# Patient Record
Sex: FEMALE | Race: WHITE
[De-identification: names, ages, dates, MRNs, and addresses within clinical notes are randomized per-mention and may not be internally consistent; named-entity substitution may affect disease eponyms.]

---

## 2019-12-28 ENCOUNTER — HOSPITAL ENCOUNTER (EMERGENCY)
Dept: HOSPITAL 25 - UCCORT | Age: 24
Discharge: HOME | End: 2019-12-28
Payer: COMMERCIAL

## 2019-12-28 VITALS — DIASTOLIC BLOOD PRESSURE: 86 MMHG | SYSTOLIC BLOOD PRESSURE: 137 MMHG

## 2019-12-28 DIAGNOSIS — L73.9: Primary | ICD-10-CM

## 2019-12-28 DIAGNOSIS — L30.4: ICD-10-CM

## 2019-12-28 DIAGNOSIS — R53.83: ICD-10-CM

## 2019-12-28 DIAGNOSIS — Z88.1: ICD-10-CM

## 2019-12-28 PROCEDURE — 99202 OFFICE O/P NEW SF 15 MIN: CPT

## 2019-12-28 PROCEDURE — G0463 HOSPITAL OUTPT CLINIC VISIT: HCPCS

## 2019-12-28 NOTE — XMS REPORT
Continuity of Care Document (C-CDA) (Encounter date: 2019 12:00 AM)

 Created on:2019



Patient:Laney

Sex:Female

:1995

External Reference #:[id number]





Demographics







 Address  [address] [place] [address]

 

 Mobile Phone  [phone]

 

 Home Phone  [phone]

 

 Email Address  [email or web address]

 

 Preferred Language  English

 

 Marital Status  Not  or 

 

 Congregation Affiliation  Unknown

 

 Race  White

 

 Ethnic Group  Not  or 









Author







 Organization  [address] [place] [address]

 

 Address  [address] [place] [address]

 

 Phone  [phone]









Support







 Name  Relationship  Address  Phone

 

 Sander Davison  parent  [address]  [phone]



     [place] [address]  

 

 sander davison  Unavailable  Unavailable  [phone]

 

 COURTNEY OQUENDO  Unavailable  Unavailable  Unavailable









Allergies, Adverse Reactions, Alerts







 Substance  Reaction  Status

 

 doxycycline  upper abdominal pain  Active







Medications







 Medication  Instructions  Dosage  Effective Dates (start - stop)  Status  
Comments









 Drug Treatment Unknown







Problems







 Condition  Effective Dates (start - stop)  Clinical Status









 Unknown







Procedures







 Procedure  Date









 Procedure Unknown







Results







 Test Name  Date and Time  Measure  Units  Reference Range  Abnormal Flag  
Status  Comments









 Unknown







Encounters







 Encounter  Practice  Location  Reason(s)  Diagnoses  Date  Provider  Providers



 Description      For Visit        Copied on



               Encounter

 

   [address] [place] [address]  Ambulatory      Dec-18    



   Inc,   Surgery      -    



   [place], [place],            



   [facility],             



   tel:[phone]            

 

   [address] [place] [address] Primary        DHRUVBABITA  



   [facility],   Care Deposit      -  COURTNEY.  



   [facility]  



   [place], [place],          [place],  



   [address], [place]          [place], [address].  



   tel:[phone]           tel:[phone]          [phone]  

 

   [address] [place] [address] Primary      Sep-19  [facility].  



   Inc,   Care Deposit        .  



   [place], [place],            



   [facility],             



   tel:[phone]            

 

   [address] [place] [address] Primary      May-19  [facility] ALBERTO.  Referring



   Inc,   Care Deposit      -  .  Provider:



   Fortunato DOMINGUEZ [facility].



   [place], [place],            



   [facility],             



   tel:[phone]            

 

   [address] [place] [address] Primary      Mar-17    



   Inc,   Care Deposit      -    



   [place], [place],            



   [address], [place]            



   tel:[phone]            

 

   1 - [facility] Primary      Mar-11    



   Inc, [address]  Care Deposit      -    



   [place], [place],            



   [address], [place]            



   tel:[phone]            

 

   1 - [facility] Primary      Feb-  LEAH  



   Inc, [address]  Care Deposit      -  AUSTINREBA Bucio          [facility] [address] [place], [place], [place],          , [place],  



   [address], [place]          [address], [place].  



   tel:[phone]           tel:[phone]          [phone]  







Family History







 Family Member  Diagnosis  Age At Onset

 

 Maternal grandfather  MI/Bypass  

 

 Father  kidney stones, diverticulosis  

 

 Paternal grandfather  MI/Lung Cancer (Cause Of Death)  

 

 Paternal grandfather    

 

 Mother  Healthy  

 

 Paternal grandfather    







Immunizations







 Vaccine  Date  Status  Comments

 

 meningococcal - Menactra  Sep-  administered  Source: New Immunization



       Record



       



       



       



       



       



       

 

 HPV  Sep-  administered  Source: New Immunization



       Record



       



       



       



       



       



       

 

 HPV  May-  administered  Note: Advised to return 4



       months from today for 3rd HPV



       vac.



       



       



       



       



       



       



       



       



       



       



       



       



       



       



       



       



       



       



       



       



       



       



       



       



       



       



       



       



       



       



                            ;



       Source: New Immunization



       Record

 

 HPV  Mar-  administered  Note: NDC#[id number]



       



       



       



       



       



       



       



       



       



       



       



       



       



       



       



       



       



       



       



       



       



       



       



       



       



       



       



       



       



       



       



       



                        ; Source:



       New Immunization Record

 

 Tdap  Mar-  administered  Note: NDC#[id number]



       



       



       



       



       



       



       



       



       



       



       



       



       



       



       



       



       



       



       



       



       



       



       



       



       



       



       



       



       



       



       



       



                        ; Source:



       New Immunization Record

 

 OPV  Mar-  administered  Note: Abstracted -2008



       



       



       



       



       



       



       



       



       



       



       



       



       



       



       



       



       



       



       



       



       



       



       



       



       



       



       



       



       



       



       



       



                        ; Source:



       New Immunization Record

 

 MMR  Mar-  administered  Note: Abstracted -2008



       



       



       



       



       



       



       



       



       



       



       



       



       



       



       



       



       



       



       



       



       



       



       



       



       



       



       



       



       



       



       



       



                        ; Source:



       New Immunization Record

 

 DTaP  Mar-  administered  Note: Abstracted -2008



       



       



       



       



       



       



       



       



       



       



       



       



       



       



       



       



       



       



       



       



       



       



       



       



       



       



       



       



       



       



       



       



                        ; Source:



       New Immunization Record

 

 MMR    administered  Note: Abstracted -2008



       



       



       



       



       



       



       



       



       



       



       



       



       



       



       



       



       



       



       



       



       



       



       



       



       



       



       



       



       



       



       



       



                        ; Source:



       New Immunization Record

 

 DTP-Hib    administered  Note: Abstracted -2005



       



       



       



       



       



       



       



       



       



       



       



       



       



       



       



       



       



       



       



       



       



       



       



       



       



       



       



       



       



       



       



       



                        ; Source:



       New Immunization Record

 

 OPV    administered  Note: Abstracted -2005



       



       



       



       



       



       



       



       



       



       



       



       



       



       



       



       



       



       



       



       



       



       



       



       



       



       



       



       



       



       



       



       



                        ; Source:



       New Immunization Record

 

 hep B (ped/adol, 3 dose)  Mar-  administered  Note: Abstracted -2005



       



       



       



       



       



       



       



       



       



       



       



       



       



       



       



       



       



       



       



       



       



       



       



       



       



       



       



       



       



       



       



       



                        ; Source:



       New Immunization Record

 

 DTP  Mar-  administered  Note: Abstracted -2005



       



       



       



       



       



       



       



       



       



       



       



       



       



       



       



       



       



       



       



       



       



       



       



       



       



       



       



       



       



       



       



       



                        ; Source:



       New Immunization Record

 

 hep B (ped/adol, 3 dose)    administered  Note: Abstracted -2005



       



       



       



       



       



       



       



       



       



       



       



       



       



       



       



       



       



       



       



       



       



       



       



       



       



       



       



       



       



       



       



       



                        ; Source:



       New Immunization Record

 

 DTP    administered  Note: Abstracted -2005



       



       



       



       



       



       



       



       



       



       



       



       



       



       



       



       



       



       



       



       



       



       



       



       



       



       



       



       



       



       



       



       



                        ; Source:



       New Immunization Record

 

 OPV    administered  Note: Abstracted 



       



       



       



       



       



       



       



       



       



       



       



       



       



       



       



       



       



       



       



       



       



       



       



       



       



       



       



       



       



       



       



       



                        ; Source:



       New Immunization Record

 

 hep B (ped/adol, 3 dose)    administered  Note: Abstracted 2005



       



       



       



       



       



       



       



       



       



       



       



       



       



       



       



       



       



       



       



       



       



       



       



       



       



       



       



       



       



       



       



       



                        ; Source:



       New Immunization Record

 

 DTP    administered  Note: Abstracted -2005



       



       



       



       



       



       



       



       



       



       



       



       



       



       



       



       



       



       



       



       



       



       



       



       



       



       



       



       



       



       



       



       



                        ; Source:



       New Immunization Record

 

 OPV    administered  Note: Abstracted 



       



       



       



       



       



       



       



       



       



       



       



       



       



       



       



       



       



       



       



       



       



       



       



       



       



       



       



       



       



       



       



       



                        ; Source:



       New Immunization Record







Payers







 Payer name  Insurance type  Covered party ID  Authorization(s)









 Unknown







Social History







 Type  Description  Quantity  Date Captured  Comments









 Unknown







Vital Signs







 Date /  Height  Weight  BMI  Pulse  Blood  Temperature  Respiratory  Body  
Head  BMI



 Time:        Rate  Pressure    Rate  Surface  Circumference  percentile



                 Area    









 Unknown







Chief Complaint And Reason For Visit

No information



Reason For Referral







 Reason For Referral

 

 Unknown







Plan Of Care







 Date  Type  Action  Status

 

   Appointment  LUCHO DAVISON  

 

   Appointment  LUCHO DAVISON  









 Date  Type  Problem  Goal  Intervention  Status  Start Date









 Unknown







History Of Present Illness







 Encounter Date  Complaint  History Of Present Illness









 No information







Functional Status







 Encounter Date  Functional Assessment  Cognitive Assessment









 Unknown







Medications Administered







 Medication  Instructions  Dosage  Effective Dates (start - stop)  Status  
Comments









 Drug Treatment Unknown







Instructions







 Date  Instruction  Additional Information









 Unknown

## 2019-12-28 NOTE — UC
Skin Complaint HPI





- HPI Summary


HPI Summary: 





24 year old female recently underwent decompression of R shoulder 12/18 

presents with rash, itchy, under R arm which is in sling since 12/18, beneath 

her breasts, and on abdomen.   Started under her right arm where her skin was 

in constant contact due to the sling, then spread.   Patient is obese.   ALso c/

o raised rash on R outer arm without itch, pain.   no spreading of this rash 

noted.    





no prior occurrences, no fever, chills.   + sweating 











- History of Current Complaint


Chief Complaint: UCRash


Time Seen by Provider: 12/28/19 18:45


Stated Complaint: RASH


Hx Obtained From: Patient


Hx Last Menstrual Period: 12/27/19


Pregnant?: No


Onset/Duration: Sudden Onset, Lasting Days


Skin Exposure Onset/Duration: Days Ago


Onset Severity: Moderate


Current Severity: Moderate


Pain Intensity: 7


Pain Scale Used: 0-10 Numeric


Location: Diffuse - abdomen, right arm





- Allergy/Home Medications


Allergies/Adverse Reactions: 


 Allergies











Allergy/AdvReac Type Severity Reaction Status Date / Time


 


doxycycline Allergy Severe Abdominal Verified 12/28/19 18:46





   Pain  











Home Medications: 


 Home Medications





Iud   12/28/19 [History]


diphenhydrAMINE HCl [Benadryl Allergy] 25 mg PO ONCE PRN 12/28/19 [History 

Confirmed 12/28/19]


diphenhydrAMINE HCl [Benadryl Allergy] 50 mg PO Q17H PRN 12/28/19 [History 

Confirmed 12/28/19]











PMH/Surg Hx/FS Hx/Imm Hx


Previously Healthy: Yes





- Surgical History


Surgical History: Yes


Surgery Procedure, Year, and Place: left shoulder 12/18/19; right shoulder 1/ 2013, 1/2014; Links procedure with titanium





- Family History


Known Family History: Positive: Non-Contributory





- Social History


Occupation: Employed Full-time


Alcohol Use: Rare


Substance Use Type: None


Smoking Status (MU): Never Smoked Tobacco





Review of Systems


All Other Systems Reviewed And Are Negative: Yes


Constitutional: Negative: Fever, Chills, Fatigue


Skin: Positive: Rash


Genitourinary: Positive: Negative


Motor: Positive: Negative


Is Patient Immunocompromised?: No





Physical Exam


Triage Information Reviewed: Yes


Appearance: Well-Appearing, No Pain Distress, Well-Nourished


Vital Signs: 


 Initial Vital Signs











Temp  98.2 F   12/28/19 18:35


 


Pulse  105   12/28/19 18:35


 


Resp  22   12/28/19 18:35


 


BP  137/86   12/28/19 18:35


 


Pulse Ox  100   12/28/19 18:35











Vital Signs Reviewed: Yes


Eyes: Positive: Conjunctiva Clear


ENT: Positive: Hearing grossly normal


Respiratory: Positive: Chest non-tender


Musculoskeletal Exam: Normal


Neurological Exam: Normal


Psychological Exam: Normal


Skin: Positive: Other - between medial upper arm and body skin with shiny, 

erythematous appearance, no scaling, sharply demarcated.   In breast folds and 

on sides of bra straps similar appearance.   On right outer arm, proximally, 

raised follicular pattern mild erythematous area without excoriations, drainage 

noted.    consistent with rubbing of strap of sling.





Course/Dx





- Course


Course Of Treatment: 





Folliculitis:   Keep area dry, clean.   Antibiotic ointment twice daily.   

Avoid rubbing in area 








Intertrigo: 


- Hydroxyzin 25 mg as needed at bedtime to help with itching. 


- Daily cream to area twice daily 


- Oral medication given to help at urgent care- Diflucan 


- Benadryl as needed for itching. 


-Daily cleansing of intertriginous skin with a mild cleanser followed by drying 

of affected area with a hair dryer on a cool setting


-Aeration of affected area when feasible


-Daily application of drying powders


-Use of absorbent material or clothing, such as cotton or quintana wool, to 

separate skin in folds





- Diagnoses


Provider Diagnosis: 


 Folliculitis, Intertrigo








Discharge ED





- Sign-Out/Discharge


Documenting (check all that apply): Patient Departure


All imaging exams completed and their final reports reviewed: No Studies





- Discharge Plan


Condition: Good


Disposition: HOME


Prescriptions: 


Clotrimazole 1% CREAM* [Clotrimazole 1%*] 1 applic TOPICAL BID #1 tube


Fluconazole 150 MG TAB* [Diflucan 150 MG TAB*] 150 mg PO ONCE #1 tablet


hydrOXYzine HCL TAB* [Atarax 25 MG TAB*] 25 mg PO TID PRN #15 tab


 PRN Reason: Itching


Patient Education Materials:  Folliculitis (ED), Skin Yeast Infection (ED)


Referrals: 


No Primary Care Phys,NOPCP [Primary Care Provider] - 


[facility] [Outside]


Additional Instructions: 


Folliculitis:   Keep area dry, clean.   Antibiotic ointment twice daily.   

Avoid rubbing in area 








Intertrigo: 


- Hydroxyzin 25 mg as needed at bedtime to help with itching. 


- Daily cream to area twice daily 


- Oral medication given to help at urgent care- Diflucan 


- Benadryl as needed for itching. 


-Daily cleansing of intertriginous skin with a mild cleanser followed by drying 

of affected area with a hair dryer on a cool setting


-Aeration of affected area when feasible


-Daily application of drying powders


-Use of absorbent material or clothing, such as cotton or quintana wool, to 

separate skin in folds





- Billing Disposition and Condition


Condition: GOOD


Disposition: Home





- Attestation Statements


Provider Attestation: 


This patient was not seen by me


I was available for consult


Chart reviewed





LAURA

## 2019-12-28 NOTE — XMS REPORT
Continuity of Care Document (C-CDA) (Encounter date: 2019 07:43 AM)

 Created on:2019



Patient:ANTOLIN

Sex:Female

:1995

External Reference #:[id number]





Demographics







 Address  [address] [place] [address]

 

 Mobile Phone  [phone]

 

 Home Phone  [phone]

 

 Email Address  [email or web address]

 

 Preferred Language  English

 

 Marital Status  Not  or 

 

 Mormon Affiliation  Unknown

 

 Race  White

 

 Ethnic Group  Not  or 









Author







 Organization  [facility]

 

 Address  [address] [place] [address]

 

 Phone  [phone]









Support







 Name  Relationship  Address  Phone

 

 Sander Davison  parent  [address]  [phone]



     [place] [address]  

 

 sander davison  Unavailable  Unavailable  [phone]

 

 COURTNEY OQUENDO  Unavailable  Unavailable  Unavailable









Care Team Providers







 Name  Role  Phone

 

 ARIELLE LYNCH MD  Unavailable  Unavailable









Allergies, Adverse Reactions, Alerts







 Substance  Reaction  Status

 

 doxycycline  upper abdominal pain  Active







Medications







 Medication  Instructions  Dosage  Effective Dates  Status  Comments



       (start - stop)    

 

 Percocet 5 mg-325  take 1 - 2 tablet    Dec- -  No Longer  Reference #:



 mg tablet  by oral route    Dec-  Active  [id number] istop



   every 6 hours as        mdd 8 please



   needed mdd 8        deliver to amb



           surg







Problems







 Condition  Effective Dates (start - stop)  Clinical Status









 Unknown







Procedures







 Procedure  Date









 Procedure Unknown







Results







 Test Name  Date and Time  Measure  Units  Reference Range  Abnormal Flag  
Status  Comments









 Unknown







Encounters







 Encounter  Practice  Location  Reason(s)  Diagnoses  Date  Provider  Providers



 Description      For Visit        Copied on



               Encounter

 

   [address] [place] [address]  Ambulatory      Dec-18  [employer]  



   [facility],   Surgery -      -2019  ARIELLE. [address],          [address].  



   [address], [place]          tel:[phone]  



   tel:[phone]          [phone]  



   [phone]            

 

   [address] [place] [address] Primary        [facility]  



   [facility],   Care Deposit      -  COURTNEY.  



   Fortunato          [facility] [address]  



   [place], [place],          [place],  



   [address], [place]          [place], [address].  



   tel:[phone]          tel:[phone]  



   [phone]          [phone]  

 

   [address] [place] [address] Primary      Sep-19  [facility]. .  



   Inc,   Care Deposit          



   [place], [place],            



   [address],             



   tel:[phone]            

 

   [facility]      May-19  [facility]. .  Referring



   Inc,   Care     Provider:



   [address] [place], [place],            



   [address],             



   tel:[phone]            

 

   [facility] - [facility]      Mar-    



   Inc,   Care Deposit          



   [place],            



   [address], [place]            



   tel:[phone]            

 

   1 - [facility]      Mar-    



   Inc,   Care Deposit          



   [place],            



   [address],             



   tel:[phone]            

 

   1 - [facility]        LEAH  



   Inc,   Care Deposit        PANDA Bucio          [facility] [address] [place], [place], [place], [place], [address],  



   [address],           [place].  



   tel:[phone]          tel:[phone]  



   [phone]          [phone]  







Family History







 Family Member  Diagnosis  Age At Onset

 

 Maternal grandfather  MI/Bypass  

 

 Father  kidney stones, diverticulosis  

 

 Paternal grandfather  MI/Lung Cancer (Cause Of Death)  

 

 Paternal grandfather    

 

 Mother  Healthy  

 

 Paternal grandfather    







Immunizations







 Vaccine  Date  Status  Comments

 

 meningococcal - Menactra  Sep-  administered  Source: New Immunization



       Record



       



       



       



       



       



       

 

 HPV  Sep-  administered  Source: New Immunization



       Record



       



       



       



       



       



       

 

 HPV  May-  administered  Note: Advised to return 4



       months from today for 3rd HPV



       vac.



       



       



       



       



       



       



       



       



       



       



       



       



       



       



       



       



       



       



       



       



       



       



       



       



       



       



       



       



       



       



                            ;



       Source: New Immunization



       Record

 

 HPV  Mar-  administered  Note: NDC#[id number]



       



       



       



       



       



       



       



       



       



       



       



       



       



       



       



       



       



       



       



       



       



       



       



       



       



       



       



       



       



       



       



       



                        ; Source:



       New Immunization Record

 

 Tdap  Mar-  administered  Note: NDC#[id number]



       



       



       



       



       



       



       



       



       



       



       



       



       



       



       



       



       



       



       



       



       



       



       



       



       



       



       



       



       



       



       



       



                        ; Source:



       New Immunization Record

 

 OPV  Mar-  administered  Note: Abstracted -2008



       



       



       



       



       



       



       



       



       



       



       



       



       



       



       



       



       



       



       



       



       



       



       



       



       



       



       



       



       



       



       



       



                        ; Source:



       New Immunization Record

 

 MMR  Mar-  administered  Note: Abstracted -2008



       



       



       



       



       



       



       



       



       



       



       



       



       



       



       



       



       



       



       



       



       



       



       



       



       



       



       



       



       



       



       



       



                        ; Source:



       New Immunization Record

 

 DTaP  Mar-  administered  Note: Abstracted -2008



       



       



       



       



       



       



       



       



       



       



       



       



       



       



       



       



       



       



       



       



       



       



       



       



       



       



       



       



       



       



       



       



                        ; Source:



       New Immunization Record

 

 MMR    administered  Note: Abstracted -2008



       



       



       



       



       



       



       



       



       



       



       



       



       



       



       



       



       



       



       



       



       



       



       



       



       



       



       



       



       



       



       



       



                        ; Source:



       New Immunization Record

 

 DTP-Hib    administered  Note: Abstracted -2005



       



       



       



       



       



       



       



       



       



       



       



       



       



       



       



       



       



       



       



       



       



       



       



       



       



       



       



       



       



       



       



       



                        ; Source:



       New Immunization Record

 

 OPV    administered  Note: Abstracted -2005



       



       



       



       



       



       



       



       



       



       



       



       



       



       



       



       



       



       



       



       



       



       



       



       



       



       



       



       



       



       



       



       



                        ; Source:



       New Immunization Record

 

 hep B (ped/adol, 3 dose)  Mar-  administered  Note: Abstracted -2005



       



       



       



       



       



       



       



       



       



       



       



       



       



       



       



       



       



       



       



       



       



       



       



       



       



       



       



       



       



       



       



       



                        ; Source:



       New Immunization Record

 

 DTP  Mar-  administered  Note: Abstracted -2005



       



       



       



       



       



       



       



       



       



       



       



       



       



       



       



       



       



       



       



       



       



       



       



       



       



       



       



       



       



       



       



       



                        ; Source:



       New Immunization Record

 

 hep B (ped/adol, 3 dose)    administered  Note: Abstracted -2005



       



       



       



       



       



       



       



       



       



       



       



       



       



       



       



       



       



       



       



       



       



       



       



       



       



       



       



       



       



       



       



       



                        ; Source:



       New Immunization Record

 

 DTP    administered  Note: Abstracted 



       



       



       



       



       



       



       



       



       



       



       



       



       



       



       



       



       



       



       



       



       



       



       



       



       



       



       



       



       



       



       



       



                        ; Source:



       New Immunization Record

 

 OPV    administered  Note: Abstracted 



       



       



       



       



       



       



       



       



       



       



       



       



       



       



       



       



       



       



       



       



       



       



       



       



       



       



       



       



       



       



       



       



                        ; Source:



       New Immunization Record

 

 hep B (ped/adol, 3 dose)    administered  Note: Abstracted 2005



       



       



       



       



       



       



       



       



       



       



       



       



       



       



       



       



       



       



       



       



       



       



       



       



       



       



       



       



       



       



       



       



                        ; Source:



       New Immunization Record

 

 DTP    administered  Note: Abstracted 



       



       



       



       



       



       



       



       



       



       



       



       



       



       



       



       



       



       



       



       



       



       



       



       



       



       



       



       



       



       



       



       



                        ; Source:



       New Immunization Record

 

 OPV    administered  Note: Abstracted -2005



       



       



       



       



       



       



       



       



       



       



       



       



       



       



       



       



       



       



       



       



       



       



       



       



       



       



       



       



       



       



       



       



                        ; Source:



       New Immunization Record







Payers







 Payer name  Insurance type  Covered party ID  Authorization(s)

 

 Alda Roland  [id number]  [id number]







Social History







 Type  Description  Quantity  Date Captured  Comments

 

 Alcohol Use Details  Unknown    Dec-  

 

 Caffeine Use Details  Unknown    Dec-  

 

 Tobacco Use Status  Unknown    Dec-  

 

 Smoking Status  Unknown    Dec-  







Vital Signs







 Date /  Height  Weight  BMI  Pulse  Blood  Temperature  Respiratory  Body  
Head  BMI



 Time:        Rate  Pressure    Rate  Surface  Circumference  percentile



                 Area    









 Unknown







Chief Complaint And Reason For Visit

No information



Reason For Referral







 Reason For Referral

 

 Unknown







Plan Of Care







 Date  Type  Action  Status

 

   Appointment  LUCHO DAVISON  

 

   Appointment  LUCHO DAVISON  









 Date  Type  Problem  Goal  Intervention  Status  Start Date









 Unknown







History Of Present Illness







 Encounter Date  Complaint  History Of Present Illness









 No information







Functional Status







 Encounter Date  Functional Assessment  Cognitive Assessment









 Unknown







Medications Administered







 Medication  Instructions  Dosage  Effective Dates (start - stop)  Status  
Comments









 Drug Treatment Unknown







Instructions







 Date  Instruction  Additional Information









 Unknown